# Patient Record
Sex: FEMALE | Race: WHITE | NOT HISPANIC OR LATINO | Employment: UNEMPLOYED | URBAN - NONMETROPOLITAN AREA
[De-identification: names, ages, dates, MRNs, and addresses within clinical notes are randomized per-mention and may not be internally consistent; named-entity substitution may affect disease eponyms.]

---

## 2021-01-20 DIAGNOSIS — I42.9 CARDIOMYOPATHY, UNSPECIFIED TYPE (HCC): Primary | ICD-10-CM

## 2021-01-28 ENCOUNTER — OFFICE VISIT (OUTPATIENT)
Dept: CARDIOLOGY | Facility: CLINIC | Age: 36
End: 2021-01-28

## 2021-01-28 ENCOUNTER — LAB (OUTPATIENT)
Dept: LAB | Facility: HOSPITAL | Age: 36
End: 2021-01-28

## 2021-01-28 VITALS
DIASTOLIC BLOOD PRESSURE: 72 MMHG | BODY MASS INDEX: 24.64 KG/M2 | HEIGHT: 67 IN | WEIGHT: 157 LBS | OXYGEN SATURATION: 98 % | HEART RATE: 71 BPM | SYSTOLIC BLOOD PRESSURE: 120 MMHG

## 2021-01-28 DIAGNOSIS — G47.33 OSA ON CPAP: ICD-10-CM

## 2021-01-28 DIAGNOSIS — I42.9 CARDIOMYOPATHY, UNSPECIFIED TYPE (HCC): Primary | ICD-10-CM

## 2021-01-28 DIAGNOSIS — I10 ESSENTIAL HYPERTENSION: ICD-10-CM

## 2021-01-28 DIAGNOSIS — Z99.89 OSA ON CPAP: ICD-10-CM

## 2021-01-28 DIAGNOSIS — R07.2 PRECORDIAL PAIN: ICD-10-CM

## 2021-01-28 DIAGNOSIS — I42.9 CARDIOMYOPATHY, UNSPECIFIED TYPE (HCC): ICD-10-CM

## 2021-01-28 LAB
ALBUMIN SERPL-MCNC: 4.5 G/DL (ref 3.5–5.2)
ALBUMIN/GLOB SERPL: 1.6 G/DL
ALP SERPL-CCNC: 64 U/L (ref 39–117)
ALT SERPL W P-5'-P-CCNC: 16 U/L (ref 1–33)
ANION GAP SERPL CALCULATED.3IONS-SCNC: 7.9 MMOL/L (ref 5–15)
AST SERPL-CCNC: 20 U/L (ref 1–32)
BASOPHILS # BLD AUTO: 0.03 10*3/MM3 (ref 0–0.2)
BASOPHILS NFR BLD AUTO: 0.3 % (ref 0–1.5)
BILIRUB SERPL-MCNC: 0.3 MG/DL (ref 0–1.2)
BUN SERPL-MCNC: 19 MG/DL (ref 6–20)
BUN/CREAT SERPL: 26.8 (ref 7–25)
CALCIUM SPEC-SCNC: 9.7 MG/DL (ref 8.6–10.5)
CHLORIDE SERPL-SCNC: 106 MMOL/L (ref 98–107)
CHOLEST SERPL-MCNC: 159 MG/DL (ref 0–200)
CO2 SERPL-SCNC: 24.1 MMOL/L (ref 22–29)
CREAT SERPL-MCNC: 0.71 MG/DL (ref 0.57–1)
DEPRECATED RDW RBC AUTO: 43.3 FL (ref 37–54)
EOSINOPHIL # BLD AUTO: 0 10*3/MM3 (ref 0–0.4)
EOSINOPHIL NFR BLD AUTO: 0 % (ref 0.3–6.2)
ERYTHROCYTE [DISTWIDTH] IN BLOOD BY AUTOMATED COUNT: 13.8 % (ref 12.3–15.4)
GFR SERPL CREATININE-BSD FRML MDRD: 93 ML/MIN/1.73
GLOBULIN UR ELPH-MCNC: 2.8 GM/DL
GLUCOSE SERPL-MCNC: 125 MG/DL (ref 65–99)
HCT VFR BLD AUTO: 36.6 % (ref 34–46.6)
HDLC SERPL-MCNC: 58 MG/DL (ref 40–60)
HGB BLD-MCNC: 11.9 G/DL (ref 12–15.9)
IMM GRANULOCYTES # BLD AUTO: 0.06 10*3/MM3 (ref 0–0.05)
IMM GRANULOCYTES NFR BLD AUTO: 0.6 % (ref 0–0.5)
LDLC SERPL CALC-MCNC: 91 MG/DL (ref 0–100)
LDLC/HDLC SERPL: 1.58 {RATIO}
LYMPHOCYTES # BLD AUTO: 1.34 10*3/MM3 (ref 0.7–3.1)
LYMPHOCYTES NFR BLD AUTO: 12.4 % (ref 19.6–45.3)
MCH RBC QN AUTO: 28.2 PG (ref 26.6–33)
MCHC RBC AUTO-ENTMCNC: 32.5 G/DL (ref 31.5–35.7)
MCV RBC AUTO: 86.7 FL (ref 79–97)
MONOCYTES # BLD AUTO: 0.15 10*3/MM3 (ref 0.1–0.9)
MONOCYTES NFR BLD AUTO: 1.4 % (ref 5–12)
NEUTROPHILS NFR BLD AUTO: 85.3 % (ref 42.7–76)
NEUTROPHILS NFR BLD AUTO: 9.26 10*3/MM3 (ref 1.7–7)
NRBC BLD AUTO-RTO: 0 /100 WBC (ref 0–0.2)
NT-PROBNP SERPL-MCNC: 92.5 PG/ML (ref 0–450)
PLATELET # BLD AUTO: 327 10*3/MM3 (ref 140–450)
PMV BLD AUTO: 9.7 FL (ref 6–12)
POTASSIUM SERPL-SCNC: 4.3 MMOL/L (ref 3.5–5.2)
PROT SERPL-MCNC: 7.3 G/DL (ref 6–8.5)
RBC # BLD AUTO: 4.22 10*6/MM3 (ref 3.77–5.28)
SODIUM SERPL-SCNC: 138 MMOL/L (ref 136–145)
TRIGL SERPL-MCNC: 48 MG/DL (ref 0–150)
TSH SERPL DL<=0.05 MIU/L-ACNC: 0.29 UIU/ML (ref 0.27–4.2)
VLDLC SERPL-MCNC: 10 MG/DL (ref 5–40)
WBC # BLD AUTO: 10.84 10*3/MM3 (ref 3.4–10.8)

## 2021-01-28 PROCEDURE — 80050 GENERAL HEALTH PANEL: CPT

## 2021-01-28 PROCEDURE — 36415 COLL VENOUS BLD VENIPUNCTURE: CPT

## 2021-01-28 PROCEDURE — 99204 OFFICE O/P NEW MOD 45 MIN: CPT | Performed by: NURSE PRACTITIONER

## 2021-01-28 PROCEDURE — 80061 LIPID PANEL: CPT

## 2021-01-28 PROCEDURE — 83880 ASSAY OF NATRIURETIC PEPTIDE: CPT

## 2021-01-28 RX ORDER — FUROSEMIDE 20 MG/1
TABLET ORAL
COMMUNITY
Start: 2021-01-14 | End: 2021-01-28 | Stop reason: SDUPTHER

## 2021-01-28 RX ORDER — PREDNISONE 10 MG/1
TABLET ORAL
COMMUNITY
Start: 2021-01-26 | End: 2021-03-10 | Stop reason: ALTCHOICE

## 2021-01-28 RX ORDER — CARVEDILOL 12.5 MG/1
12.5 TABLET ORAL 2 TIMES DAILY
Qty: 60 TABLET | Refills: 5 | Status: SHIPPED | OUTPATIENT
Start: 2021-01-28 | End: 2021-03-10 | Stop reason: SDUPTHER

## 2021-01-28 RX ORDER — LORATADINE 10 MG/1
10 TABLET ORAL DAILY
COMMUNITY
Start: 2021-01-14

## 2021-01-28 RX ORDER — FUROSEMIDE 20 MG/1
20 TABLET ORAL DAILY PRN
Qty: 30 TABLET | Refills: 5 | Status: SHIPPED | OUTPATIENT
Start: 2021-01-28

## 2021-01-28 RX ORDER — CARVEDILOL 12.5 MG/1
12.5 TABLET ORAL 2 TIMES DAILY
COMMUNITY
Start: 2021-01-14 | End: 2021-01-28 | Stop reason: SDUPTHER

## 2021-01-28 RX ORDER — LISINOPRIL 2.5 MG/1
2.5 TABLET ORAL DAILY
Qty: 30 TABLET | Refills: 5 | Status: SHIPPED | OUTPATIENT
Start: 2021-01-28 | End: 2021-03-10 | Stop reason: SDUPTHER

## 2021-01-28 NOTE — PROGRESS NOTES
[facility] CHF CLINIC OFFICE VISIT    Subjective:     Congestive Heart Failure (New Patient)      Cardiomyopathy  This is a chronic (Hx of previously low EF 31-37 in 2016. ) problem. The current episode started more than 1 year ago. The problem occurs rarely. The problem has been gradually improving. Associated symptoms include chest pain and fatigue. Pertinent negatives include no abdominal pain, anorexia, arthralgias, change in bowel habit, chills, congestion, coughing, diaphoresis, fever, headaches, joint swelling, myalgias, nausea, neck pain, numbness, rash, sore throat, swollen glands, urinary symptoms, vertigo, visual change, vomiting or weakness. Nothing aggravates the symptoms. She has tried nothing for the symptoms. The treatment provided mild relief.         PCP: Merna ROWELL  Cardiologist:  Nephrologist:  Pulmonologist:    Hospitalizations:      Elmer Bhardwaj is a 35-year-old female with PMH of cardiomyopathy, congestive heart failure with history of previously reduced LVEF, patient mentions that previously being 31 to 37%. Patient used to see Dr. Novoa at [facility] cardiology. Mentions being diagnosed with cardiomyopathy at age 25.  Last echo from 2016 showing LVEF 41%, left atrium is mildly dilated, trace tricuspid valve regurgitation, mildly reduced left ventricular systolic function.  Patient mentions having previous nuclear stress test and left heart catheterization. Mentions mild CAD at that time that did not require intervention.  We will attempt to retrieve those records for clarification.  Patient mentions previously being on medications for her heart failure and having volume overload. She was lost to follow up d/t compliance issues.  Other PMH: KANDY (has not followed with sleep medicine in some time), HTN, asthma, and allergic rhinitis.     Patient is now in a drug rehab program [place] [facility].  She reports having history of meth use, last used 4 to 5 months ago.  Former Smoker. Denies alcohol use.     She recently seen PCP and was restarted on her cardiomyopathy medications and hypertensive medications.  She is currently taking carvedilol 12.5 mg twice daily, Lasix 20 mg daily as needed.  Albuterol and Breo for her asthma.  Patient mentions having occasional intermittent edema and occasional shortness of breath.  Today she is NYHA class I and no limitations of physical activity.  Is currently working as a  and  for [employer].  She denies orthopnea, PND, dizziness or syncope.    She does complain of central substernal chest pain described as sharp and stabbing x several months.  This occurs daily.  Is intermittent.  Waxes and wanes.  Denies any worsening or relieving factors.  Associated symptoms of shortness of breath and fatigue.    EKG at PCPs office NSR with no acute ST-T wave changes.       Echocardiogram was completed today showing normal LVEF at 61%, left ventricular systolic function is normal, left ventricular diastolic function is normal, no regional wall motion abnormalities detected, mild tricuspid valve regurgitation.  Discussed with patient today that LVEF has improved back to baseline.    Past Medical History:   Diagnosis Date   • Allergies    • Asthma    • CHF (congestive heart failure) (CMS/[facility])    • Hypertension    • Sleep apnea      Past Surgical History:   Procedure Laterality Date   •  SECTION     • CHOLECYSTECTOMY       Social History     Socioeconomic History   • Marital status: Single     Spouse name: Not on file   • Number of children: Not on file   • Years of education: Not on file   • Highest education level: Not on file   Tobacco Use   • Smoking status: Former Smoker   • Smokeless tobacco: Never Used   Substance and Sexual Activity   • Alcohol use: Never     Frequency: Never   • Drug use: Not Currently     History reviewed. No pertinent family history.    Allergies:  Allergies   Allergen Reactions   • Latex Hives   •  Methotrexate Derivatives Swelling       Review of Systems   Constitution: Positive for fatigue. Negative for chills, diaphoresis, fever, malaise/fatigue and weight gain.   HENT: Negative for congestion, nosebleeds, sore throat and tinnitus.    Eyes: Negative for blurred vision and double vision.   Cardiovascular: Positive for chest pain. Negative for dyspnea on exertion, irregular heartbeat, leg swelling, palpitations and syncope.   Respiratory: Negative for cough, shortness of breath, sleep disturbances due to breathing and snoring.    Endocrine: Negative for polydipsia, polyphagia and polyuria.   Hematologic/Lymphatic: Negative for bleeding problem. Does not bruise/bleed easily.   Skin: Negative for color change, rash and suspicious lesions.   Musculoskeletal: Negative for arthralgias, falls, joint swelling, myalgias and neck pain.   Gastrointestinal: Negative for bloating, abdominal pain, anorexia, change in bowel habit, heartburn, hematochezia, nausea and vomiting.   Genitourinary: Negative for dysuria and hematuria.   Neurological: Negative for dizziness, headaches, numbness, seizures, vertigo and weakness.   Psychiatric/Behavioral: Negative for altered mental status and depression. The patient does not have insomnia and is not nervous/anxious.    Allergic/Immunologic: Negative for environmental allergies and persistent infections.       Current Outpatient Medications   Medication Sig Dispense Refill   • Advair Diskus 250-50 MCG/DOSE DISKUS Inhale 1 puff 2 (Two) Times a Day.     • carvedilol (COREG) 12.5 MG tablet Take 1 tablet by mouth 2 (Two) Times a Day. 60 tablet 5   • furosemide (LASIX) 20 MG tablet Take 1 tablet by mouth Daily As Needed (edema, weight gain of 2-3 lbs). 30 tablet 5   • HM Loratadine 10 MG tablet Take 10 mg by mouth Daily.     • predniSONE (DELTASONE) 10 MG tablet TAKE AS DIRECTED PER PACKAGE DIRECTIONS (ATTACHED SHEET)     • ProAir  (90 Base) MCG/ACT inhaler Inhale 2 puffs Every 4  "(Four) Hours As Needed.     • lisinopril (PRINIVIL,ZESTRIL) 2.5 MG tablet Take 1 tablet by mouth Daily. 30 tablet 5     No current facility-administered medications for this visit.         Objective:     Vitals:    01/28/21 1339   BP: 120/72   Pulse: 71   SpO2: 98%   Weight: 71.2 kg (157 lb)   Height: 170 cm (66.93\")       Wt Readings from Last 3 Encounters:   01/28/21 71.2 kg (157 lb)   01/28/21 71.2 kg (157 lb)            Vitals signs reviewed.   Constitutional:       General: Not in acute distress.     Appearance: Normal appearance. Well-developed. Not toxic-appearing or diaphoretic.   Eyes:      General: Lids are normal.      Conjunctiva/sclera: Conjunctivae normal.   HENT:      Head: Normocephalic and atraumatic.      Right Ear: External ear normal.      Left Ear: External ear normal.   Neck:      Musculoskeletal: Normal range of motion and neck supple.      Vascular: No JVD.   Pulmonary:      Effort: Pulmonary effort is normal. No respiratory distress.      Breath sounds: Normal breath sounds. No decreased breath sounds. No wheezing. No rales.   Chest:      Chest wall: Not tender to palpatation.   Cardiovascular:      PMI at left midclavicular line. Normal rate. Regular rhythm. Normal S1 with normal intensity. Normal S2 with normal intensity.      Murmurs: There is no murmur.      No gallop. No S3 and S4 gallop. No click. No rub.   Pulses:     Intact distal pulses. No decreased pulses.   Edema:     Peripheral edema absent.   Abdominal:      General: Bowel sounds are normal. There is no distension.      Palpations: Abdomen is soft.      Tenderness: There is no abdominal tenderness.   Skin:     General: Skin is warm and dry.      Coloration: Skin is not pale.      Findings: No erythema or rash.   Neurological:      Mental Status: Alert and oriented to person, place, and time.      Gait: Gait normal.   Psychiatric:         Behavior: Behavior normal.         Thought Content: Thought content normal.         " Judgment: Judgment normal.         Cardiographics  Results for orders placed during the hospital encounter of 01/28/21   Adult Transthoracic Echo Complete w/ Color, Spectral and Contrast if Necessary Per Protocol    Narrative · Estimated left ventricular EF = 61% Left ventricular ejection fraction   appears to be 61 - 65%. Left ventricular systolic function is normal.  · Left ventricular diastolic function was normal.  · Estimated right ventricular systolic pressure from tricuspid   regurgitation is normal (<35 mmHg).          @LPGRADLAST    No radiology results for the last 30 days.    Echocardiogram:     1/28/2021  Left Ventricle Estimated left ventricular EF = 61% Left ventricular ejection fraction appears to be 61 - 65%. Left ventricular systolic function is normal.   Normal left ventricular cavity size and wall thickness noted. All left ventricular wall segments contract normally. Left ventricular diastolic function was normal.   Right Ventricle Normal right ventricular cavity size and systolic function noted. There is no evidence of a right ventricular thrombus or mass present.   Left Atrium Normal left atrial size and volume noted. No evidence of left atrial thrombus or mass present.   Right Atrium Normal right atrial cavity size noted.   Aortic Valve The aortic valve is grossly normal in structure. No significant aortic valve regurgitation is present. No hemodynamically significant aortic valve stenosis is present.   Mitral Valve The mitral valve is grossly normal in structure. Trace to mild mitral valve regurgitation is present.   Tricuspid Valve Mild tricuspid valve regurgitation is present. Estimated right ventricular systolic pressure from tricuspid regurgitation is normal (<35 mmHg). No evidence of pulmonary hypertension is present.   Pulmonic Valve The pulmonic valve is grossly normal in structure. There is no significant pulmonic valve regurgitation present.   Greater Vessels No dilation of the aortic  root is present. No dilation of the sinuses of Valsalva is present. No dilation of the proximal aorta is present.   Pericardium There is no evidence of pericardial effusion. .           2016           EKG:    Stress Testing:     C:    Imaging    Chest x-ray:    CT:    Lab Review     Lab Results   Component Value Date    TSH 0.295 01/28/2021     Lab Results   Component Value Date    GLUCOSE 125 (H) 01/28/2021    BUN 19 01/28/2021    CREATININE 0.71 01/28/2021    EGFRIFNONA 93 01/28/2021    BCR 26.8 (H) 01/28/2021    K 4.3 01/28/2021    CO2 24.1 01/28/2021    CALCIUM 9.7 01/28/2021    ALBUMIN 4.50 01/28/2021    AST 20 01/28/2021    ALT 16 01/28/2021     Lab Results   Component Value Date    WBC 10.84 (H) 01/28/2021    HGB 11.9 (L) 01/28/2021    HCT 36.6 01/28/2021    MCV 86.7 01/28/2021     01/28/2021       No results found for: CKTOTAL, CKMB, CKMBINDEX, TROPONINI, TROPONINT  Lab Results   Component Value Date    PROBNP 92.5 01/28/2021       PFTS:         The following portions of the patient's history were reviewed and updated as appropriate: allergies, current medications, past family history, past medical history, past social history, past surgical history and problem list.     Old records reviewed and pertinent information is included in the above objective data.     Assessment/Plan:      Diagnosis Plan   1. Cardiomyopathy, unspecified type (CMS/HCC)  lisinopril (PRINIVIL,ZESTRIL) 2.5 MG tablet    carvedilol (COREG) 12.5 MG tablet    furosemide (LASIX) 20 MG tablet    Stress Test With Myocardial Perfusion One Day    Comprehensive Metabolic Panel    CBC & Differential    TSH    Lipid Panel    proBNP    Likely NICM d/t previous drug use.  History of previously reduced LVEF in 2016 --31 to 37%, new echo today showing normal LVEF at 61%.  NYHA Class I Stage C. Patient appears euvolemic.  and in a well perfused physiologic state. Hemodynamics are acceptable  BETA-BLOCKER: Coreg 12.5 mg twice daily  ACE/ARB:  Add lisinopril 2.5 mg daily  ENTRESTO: not indicated  DIURETIC: Lasix 20 mg daily as needed  ALDOSTERONE ANTAGONIST: Not currently indicated.  IMDUR/HYDRALAZINE: N/A  DIGOXIN: N/A  Fluid restriction: 2 L  Sodium restriction:2 grams  6MWT:   Cardiac Rehab: not indicated  ICD: not indicated     Presented an overview of heart failure, expected course, considerations, risk factors and exacerbation prevention.  Recommended daily weight monitoring. Information provided.  Recommended restricted dietary sodium intake of 2g/day  Fluid restrictions of 2L/day.  Discussed patient action plan for heart failure;  Recommended avoiding NSAIDs use.  Discussed warning signs requiring additional medical attention for heart failure.  Do not hesitate to contact this office for further symptoms or concerns. Contact information provided to the patient and understanding verbalized.        2.  Precordial Pain Chest pain syndrome. Pain characteristic: atypical angina   Patient is Moderate Risk.     Ischemic evaluation:ordered.   The patient is able to exercise. hx of cardiomyopathy, previous low LVEF  EKG is Normal  Risks/Benefits discussed  Ischemia evaluation with Exercise Cardiolite  TTE -improvement in LVEF to 61%, no RWMAs, no significant valvular abnormalities.  Basic Labs, PA/LA CXR (results reviewed if completed)       3.  Essential hypertension   chronic, controlled.  Continue Coreg 12.5 mg twice daily, Added lisinopril 2.5mg daily       4. KANDY on CPAP  Ambulatory Referral to Sleep Medicine    Patient reports he is history of KANDY, and used to wear CPAP machine.  Patient was requesting to reestablish with sleep medicine.         Follow up: 1 month              This document has been electronically signed by LELIA Caro on January 29, 2021 09:09 CST

## 2021-01-29 ENCOUNTER — TELEPHONE (OUTPATIENT)
Dept: CARDIOLOGY | Facility: CLINIC | Age: 36
End: 2021-01-29

## 2021-01-29 PROBLEM — G47.33 OSA ON CPAP: Status: ACTIVE | Noted: 2021-01-29

## 2021-01-29 PROBLEM — I10 ESSENTIAL HYPERTENSION: Status: ACTIVE | Noted: 2021-01-29

## 2021-01-29 PROBLEM — I42.9 CARDIOMYOPATHY (HCC): Status: ACTIVE | Noted: 2021-01-29

## 2021-01-29 PROBLEM — R07.2 PRECORDIAL PAIN: Status: ACTIVE | Noted: 2021-01-29

## 2021-01-29 PROBLEM — Z99.89 OSA ON CPAP: Status: ACTIVE | Noted: 2021-01-29

## 2021-01-29 NOTE — TELEPHONE ENCOUNTER
Spoke with mentor at [facility], she is listed as patient's contact information.  Thyroid function, cholesterol and heart failure labs all look good normal.  Patient's WBC and neutrophils are slightly elevated dust of a bacterial infection.  They requested labs to be faxed to them.  Directed her that if she is having symptoms of infection that she should call PCP to discuss if treatment is needed.                  This document has been electronically signed by LELAI Caro on January 29, 2021 11:14 CST

## 2021-02-19 ENCOUNTER — HOSPITAL ENCOUNTER (OUTPATIENT)
Dept: CARDIOLOGY | Facility: HOSPITAL | Age: 36
Discharge: HOME | End: 2021-02-19

## 2021-02-19 ENCOUNTER — HOSPITAL ENCOUNTER (OUTPATIENT)
Dept: NUCLEAR MEDICINE | Facility: HOSPITAL | Age: 36
Discharge: HOME | End: 2021-02-19

## 2021-02-19 DIAGNOSIS — I42.9 CARDIOMYOPATHY, UNSPECIFIED TYPE (HCC): ICD-10-CM

## 2021-02-19 LAB
BH CV STRESS BP STAGE 1: NORMAL
BH CV STRESS BP STAGE 2: NORMAL
BH CV STRESS BP STAGE 3: NORMAL
BH CV STRESS DURATION MIN STAGE 1: 3
BH CV STRESS DURATION MIN STAGE 2: 3
BH CV STRESS DURATION MIN STAGE 3: 3
BH CV STRESS DURATION MIN STAGE 4: 3
BH CV STRESS DURATION SEC STAGE 1: 0
BH CV STRESS DURATION SEC STAGE 2: 0
BH CV STRESS DURATION SEC STAGE 3: 0
BH CV STRESS DURATION SEC STAGE 4: 21
BH CV STRESS GRADE STAGE 1: 10
BH CV STRESS GRADE STAGE 2: 12
BH CV STRESS GRADE STAGE 3: 14
BH CV STRESS GRADE STAGE 4: 16
BH CV STRESS HR STAGE 1: 88
BH CV STRESS HR STAGE 2: 108
BH CV STRESS HR STAGE 3: 141
BH CV STRESS HR STAGE 4: 155
BH CV STRESS METS STAGE 1: 5
BH CV STRESS METS STAGE 2: 7.5
BH CV STRESS METS STAGE 3: 10
BH CV STRESS METS STAGE 4: 13.5
BH CV STRESS PROTOCOL 1: NORMAL
BH CV STRESS RECOVERY BP: NORMAL MMHG
BH CV STRESS RECOVERY HR: 98 BPM
BH CV STRESS SPEED STAGE 1: 1.7
BH CV STRESS SPEED STAGE 2: 2.5
BH CV STRESS SPEED STAGE 3: 3.4
BH CV STRESS SPEED STAGE 4: 4.2
BH CV STRESS STAGE 1: 1
BH CV STRESS STAGE 2: 2
BH CV STRESS STAGE 3: 3
BH CV STRESS STAGE 4: 4
LV EF NUC BP: 60 %
MAXIMAL PREDICTED HEART RATE: 183 BPM
PERCENT MAX PREDICTED HR: 85.79 %
STRESS BASELINE BP: NORMAL MMHG
STRESS BASELINE HR: 62 BPM
STRESS PERCENT HR: 101 %
STRESS POST ESTIMATED WORKLOAD: 13.4 METS
STRESS POST EXERCISE DUR MIN: 12 MIN
STRESS POST EXERCISE DUR SEC: 21 SEC
STRESS POST PEAK BP: NORMAL MMHG
STRESS POST PEAK HR: 157 BPM
STRESS TARGET HR: 156 BPM

## 2021-02-19 PROCEDURE — 93018 CV STRESS TEST I&R ONLY: CPT | Performed by: INTERNAL MEDICINE

## 2021-02-19 PROCEDURE — 93017 CV STRESS TEST TRACING ONLY: CPT

## 2021-02-19 PROCEDURE — A9500 TC99M SESTAMIBI: HCPCS | Performed by: NURSE PRACTITIONER

## 2021-02-19 PROCEDURE — 78452 HT MUSCLE IMAGE SPECT MULT: CPT | Performed by: INTERNAL MEDICINE

## 2021-02-19 PROCEDURE — 0 TECHNETIUM SESTAMIBI: Performed by: NURSE PRACTITIONER

## 2021-02-19 PROCEDURE — 78452 HT MUSCLE IMAGE SPECT MULT: CPT

## 2021-02-19 PROCEDURE — 93016 CV STRESS TEST SUPVJ ONLY: CPT | Performed by: INTERNAL MEDICINE

## 2021-02-19 RX ADMIN — TECHNETIUM TC 99M SESTAMIBI 1 DOSE: 1 INJECTION INTRAVENOUS at 09:30

## 2021-02-19 RX ADMIN — TECHNETIUM TC 99M SESTAMIBI 1 DOSE: 1 INJECTION INTRAVENOUS at 10:30

## 2021-03-10 ENCOUNTER — OFFICE VISIT (OUTPATIENT)
Dept: CARDIOLOGY | Facility: CLINIC | Age: 36
End: 2021-03-10

## 2021-03-10 VITALS
SYSTOLIC BLOOD PRESSURE: 117 MMHG | HEART RATE: 65 BPM | DIASTOLIC BLOOD PRESSURE: 71 MMHG | HEIGHT: 67 IN | WEIGHT: 177.8 LBS | OXYGEN SATURATION: 98 % | BODY MASS INDEX: 27.91 KG/M2

## 2021-03-10 DIAGNOSIS — J45.40 MODERATE PERSISTENT ASTHMA WITHOUT COMPLICATION: ICD-10-CM

## 2021-03-10 DIAGNOSIS — G47.33 OSA ON CPAP: ICD-10-CM

## 2021-03-10 DIAGNOSIS — R07.2 PRECORDIAL PAIN: ICD-10-CM

## 2021-03-10 DIAGNOSIS — Z99.89 OSA ON CPAP: ICD-10-CM

## 2021-03-10 DIAGNOSIS — I10 ESSENTIAL HYPERTENSION: ICD-10-CM

## 2021-03-10 DIAGNOSIS — I42.8 NICM (NONISCHEMIC CARDIOMYOPATHY) (HCC): Primary | ICD-10-CM

## 2021-03-10 LAB
QT INTERVAL: 414 MS
QTC INTERVAL: 434 MS

## 2021-03-10 PROCEDURE — 93000 ELECTROCARDIOGRAM COMPLETE: CPT | Performed by: INTERNAL MEDICINE

## 2021-03-10 PROCEDURE — 99214 OFFICE O/P EST MOD 30 MIN: CPT | Performed by: NURSE PRACTITIONER

## 2021-03-10 RX ORDER — LISINOPRIL 2.5 MG/1
2.5 TABLET ORAL DAILY
Qty: 30 TABLET | Refills: 2 | Status: SHIPPED | OUTPATIENT
Start: 2021-03-10

## 2021-03-10 RX ORDER — CEFPROZIL 250 MG/1
250 TABLET, FILM COATED ORAL 2 TIMES DAILY
COMMUNITY

## 2021-03-10 RX ORDER — CARVEDILOL 12.5 MG/1
12.5 TABLET ORAL 2 TIMES DAILY
Qty: 60 TABLET | Refills: 2 | Status: SHIPPED | OUTPATIENT
Start: 2021-03-10

## 2021-03-10 NOTE — PROGRESS NOTES
[facility] CHF CLINIC OFFICE VISIT    Subjective:     Cardiomyopathy and Results (chief complaint)      Cardiomyopathy  This is a chronic (Hx of previously low EF 31-37 in 2016. ) problem. The current episode started more than 1 year ago. The problem occurs rarely. The problem has been gradually improving. Associated symptoms include chest pain and fatigue. Pertinent negatives include no abdominal pain, anorexia, arthralgias, change in bowel habit, chills, congestion, coughing, diaphoresis, fever, headaches, joint swelling, myalgias, nausea, neck pain, numbness, rash, sore throat, swollen glands, urinary symptoms, vertigo, visual change, vomiting or weakness. Nothing aggravates the symptoms. She has tried nothing for the symptoms. The treatment provided mild relief.       PCP: Mrena ROWELL  Cardiologist:  Nephrologist:  Pulmonologist:    Hospitalizations:      Elmer Bhardwaj is a 35-year-old female with PMH of cardiomyopathy, congestive heart failure with history of previously reduced LVEF, patient mentions that previously being 31 to 37%. Patient used to see Dr. Novoa at [facility] cardiology. Mentions being diagnosed with cardiomyopathy at age 25.  Last echo from 2016 showing LVEF 41%, left atrium is mildly dilated, trace tricuspid valve regurgitation, mildly reduced left ventricular systolic function.  Patient mentions having previous nuclear stress test and left heart catheterization. Mentions mild CAD at that time that did not require intervention, however I was unable to find evidence of this in her medical records. Patient mentions previously being on medications for her heart failure and having volume overload. She was lost to follow up d/t compliance issues.  Other PMH: KANDY (has not followed with sleep medicine in some time), HTN, asthma, and allergic rhinitis.     Patient is now in a drug rehab program [facility] [place] in [facility].  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 Former Smoker. Denies alcohol use.     She seen PCP and was restarted on her cardiomyopathy medications and hypertensive medications.  She is currently taking carvedilol 12.5 mg twice daily, Lasix 20 mg daily as needed, and lisinopril 2.5 mg. Albuterol and Breo for her asthma.  Patient mentions having occasional intermittent edema and occasional shortness of breath.  Today she is NYHA class I and no limitations of physical activity.  Is currently working as a  and  for [employer].  She denies orthopnea, PND, dizziness or syncope.    At her last office visit, she was complaining of intermittent substernal chest pain times several months.  In evaluation of her symptoms patient underwent exercise Cardiolite.  Patient exercised for 12 minutes and 21 seconds achieving 13.4 METs of workload.  Myocardial perfusion imaging indicates a normal myocardial perfusion study with no evidence of ischemia.  ECG stress test.  Impressions consistent with a low risk study.  Echocardiogram was updated showing normal LVEF at 61%, left ventricular systolic function is normal, left ventricular diastolic function is normal, no regional wall motion abnormalities detected, mild tricuspid valve regurgitation.  Discussed with patient today that LVEF has improved back to baseline.           Past Medical History:   Diagnosis Date   • Allergies    • Asthma    • CHF (congestive heart failure) (CMS/[facility])    • Hypertension    • Sleep apnea      Past Surgical History:   Procedure Laterality Date   •  SECTION     • CHOLECYSTECTOMY       Social History     Socioeconomic History   • Marital status: Single     Spouse name: Not on file   • Number of children: Not on file   • Years of education: Not on file   • Highest education level: Not on file   Tobacco Use   • Smoking status: Never Smoker   • Smokeless tobacco: Never Used   Substance and Sexual Activity   • Alcohol use: Never   • Drug use: Not Currently     History  reviewed. No pertinent family history.    Allergies:  Allergies   Allergen Reactions   • Latex Hives   • Methotrexate Derivatives Swelling       Review of Systems   Constitutional: Positive for fatigue. Negative for chills, diaphoresis, fever, malaise/fatigue and weight gain.   HENT: Negative for congestion, nosebleeds, sore throat and tinnitus.    Eyes: Negative for blurred vision and double vision.   Cardiovascular: Positive for chest pain. Negative for dyspnea on exertion, irregular heartbeat, leg swelling, palpitations and syncope.   Respiratory: Negative for cough, shortness of breath, sleep disturbances due to breathing and snoring.    Endocrine: Negative for polydipsia, polyphagia and polyuria.   Hematologic/Lymphatic: Negative for bleeding problem. Does not bruise/bleed easily.   Skin: Negative for color change, rash and suspicious lesions.   Musculoskeletal: Negative for arthralgias, falls, joint swelling, myalgias and neck pain.   Gastrointestinal: Negative for bloating, abdominal pain, anorexia, change in bowel habit, heartburn, hematochezia, nausea and vomiting.   Genitourinary: Negative for dysuria and hematuria.   Neurological: Negative for dizziness, headaches, numbness, seizures, vertigo and weakness.   Psychiatric/Behavioral: Negative for altered mental status and depression. The patient does not have insomnia and is not nervous/anxious.    Allergic/Immunologic: Negative for environmental allergies and persistent infections.       Current Outpatient Medications   Medication Sig Dispense Refill   • Advair Diskus 250-50 MCG/DOSE DISKUS Inhale 1 puff 2 (Two) Times a Day. 60 each 5   • carvedilol (COREG) 12.5 MG tablet Take 1 tablet by mouth 2 (Two) Times a Day. 60 tablet 2   • cefprozil (CEFZIL) 250 MG tablet Take 250 mg by mouth 2 (Two) Times a Day.     • furosemide (LASIX) 20 MG tablet Take 1 tablet by mouth Daily As Needed (edema, weight gain of 2-3 lbs). 30 tablet 5   • HM Loratadine 10 MG tablet  "Take 10 mg by mouth Daily.     • lisinopril (PRINIVIL,ZESTRIL) 2.5 MG tablet Take 1 tablet by mouth Daily. 30 tablet 2   • ProAir  (90 Base) MCG/ACT inhaler Inhale 2 puffs Every 4 (Four) Hours As Needed for Wheezing or Shortness of Air. 18 g 5     No current facility-administered medications for this visit.        Objective:     Vitals:    03/10/21 0858   BP: 117/71   BP Location: Left arm   Patient Position: Sitting   Cuff Size: Adult   Pulse: 65   SpO2: 98%   Weight: 80.6 kg (177 lb 12.8 oz)   Height: 170 cm (66.93\")       Wt Readings from Last 3 Encounters:   03/10/21 80.6 kg (177 lb 12.8 oz)   01/28/21 71.2 kg (157 lb)   01/28/21 71.2 kg (157 lb)            Vitals reviewed.   Constitutional:       General: Not in acute distress.     Appearance: Normal appearance. Well-developed. Not toxic-appearing or diaphoretic.   Eyes:      General: Lids are normal.      Conjunctiva/sclera: Conjunctivae normal.   HENT:      Head: Normocephalic and atraumatic.      Right Ear: External ear normal.      Left Ear: External ear normal.   Neck:      Vascular: No JVD.   Pulmonary:      Effort: Pulmonary effort is normal. No respiratory distress.      Breath sounds: Normal breath sounds. No decreased breath sounds. No wheezing. No rales.   Chest:      Chest wall: Not tender to palpatation.   Cardiovascular:      PMI at left midclavicular line. Normal rate. Regular rhythm. Normal S1 with normal intensity. Normal S2 with normal intensity.      Murmurs: There is no murmur.      No gallop. No S3 and S4 gallop. No click. No rub.   Pulses:     Intact distal pulses. No decreased pulses.   Edema:     Peripheral edema absent.   Abdominal:      General: Bowel sounds are normal. There is no distension.      Palpations: Abdomen is soft.      Tenderness: There is no abdominal tenderness.   Musculoskeletal:      Cervical back: Normal range of motion and neck supple. Skin:     General: Skin is warm and dry.      Coloration: Skin is not pale. "      Findings: No erythema or rash.   Neurological:      Mental Status: Alert and oriented to person, place, and time.      Gait: Gait normal.   Psychiatric:         Behavior: Behavior normal.         Thought Content: Thought content normal.         Judgment: Judgment normal.         Cardiographics  Results for orders placed during the hospital encounter of 01/28/21    Adult Transthoracic Echo Complete w/ Color, Spectral and Contrast if Necessary Per Protocol    Interpretation Summary  · Estimated left ventricular EF = 61% Left ventricular ejection fraction appears to be 61 - 65%. Left ventricular systolic function is normal.  · Left ventricular diastolic function was normal.  · Estimated right ventricular systolic pressure from tricuspid regurgitation is normal (<35 mmHg).      @LPGRADLAST    No radiology results for the last 30 days.    Echocardiogram:     1/28/2021  Left Ventricle Estimated left ventricular EF = 61% Left ventricular ejection fraction appears to be 61 - 65%. Left ventricular systolic function is normal.   Normal left ventricular cavity size and wall thickness noted. All left ventricular wall segments contract normally. Left ventricular diastolic function was normal.   Right Ventricle Normal right ventricular cavity size and systolic function noted. There is no evidence of a right ventricular thrombus or mass present.   Left Atrium Normal left atrial size and volume noted. No evidence of left atrial thrombus or mass present.   Right Atrium Normal right atrial cavity size noted.   Aortic Valve The aortic valve is grossly normal in structure. No significant aortic valve regurgitation is present. No hemodynamically significant aortic valve stenosis is present.   Mitral Valve The mitral valve is grossly normal in structure. Trace to mild mitral valve regurgitation is present.   Tricuspid Valve Mild tricuspid valve regurgitation is present. Estimated right ventricular systolic pressure from tricuspid  regurgitation is normal (<35 mmHg). No evidence of pulmonary hypertension is present.   Pulmonic Valve The pulmonic valve is grossly normal in structure. There is no significant pulmonic valve regurgitation present.   Greater Vessels No dilation of the aortic root is present. No dilation of the sinuses of Valsalva is present. No dilation of the proximal aorta is present.   Pericardium There is no evidence of pericardial effusion. .           2016           EKG:        Stress Testing: Exercise Cardiolite 2/19/21    Interpretation Summary    · Findings consistent with a normal ECG stress test.  · Left ventricular ejection fraction is normal. (Calculated EF = 60%).  · Myocardial perfusion imaging indicates a normal myocardial perfusion study with no evidence of ischemia.  · Impressions are consistent with a low risk study.      Patient Hx Of Height, Weight, and Vitals    Height Weight BSA (Calculated - sq m) BMI (kg/m2) Pulse BP           Reason for Exam    Chest Pain; Ischemic Equivalent   Dx: Cardiomyopathy, unspecified type (CMS/HCC) [I42.9 (ICD-10-CM)]    Stress Data    Stage Heart Rate (BPM) Blood Pressure (mmHg) Minutes Seconds Grade (%) Speed (MPH)   1        88        112/60        3        0        10        1.7          2        108        130/58        3        0        12        2.5          3        141        132/58        3        0        14        3.4          4        155            3        21        16        4.2             Stress Measurements    Baseline Vitals   Baseline HR 62 bpm      Baseline /78 mmHg       Peak Stress Vitals   Peak  bpm      Peak /82 mmHg       Recovery Vitals   Recovery HR 98 bpm      Recovery /83 mmHg       Exercise Data   Target HR (85%) 156 bpm      Max. Pred. HR (100%) 183 bpm      Percent Max Pred HR 85.79 %      Exercise duration (min) 12 min      Exercise duration (sec) 21 sec      Estimated workload 13.4 METS         Study  Description    Nuclear Study Description A 1-day rest/stress protocol myocardial perfusion imaging study was performed. While at rest, the patient was injected intravenously with 10.7 mCi of technetium sestamibi at 09:30 CST. Rest imaging was performed approximately 60 minutes after the injection. While at peak stress, the patient was injected intravenously with 35 mCi of technetium sestamibi at 10:30 CST. Stress imaging was performed approximately 20 minutes after the injection. The total amount of radiation received in the study is about 13.79 mSv.   Nuclear Perfusion Images Overall image quality is good.   Imaging Contrast/Medications:     technetium sestamibi (CARDIOLITE) injection 1 dose   Given: 1 dose Intravenous    technetium sestamibi (CARDIOLITE) injection 1 dose   Given: 1 dose Intravenous   Stress Procedure    Rest ECG Baseline ECG of normal sinus rhythm noted. Normal baseline ECG noted at rest.   There was no ST segment deviation noted.   Stress Description A stress test was performed following the Julio protocol.   The patient reached the end of the protocol and achieved the target heart rate. The patient requested the test to be stopped  because the patient experienced fatigue and shortness of breath.   The patient reported dyspnea and shortness of breath during the stress test. Onset of symptoms occurred at stage 3 of the protocol. The patient experienced no angina during the stress test.   Blood pressure demonstrated a normal response to stress. Heart rate demonstrated a normal response to stress. Overall, the patient's exercise capacity was normal.   Stress ECG Stress ECG of normal sinus rhythm noted. Normal ECG with no significant stress induced changes noted.   There was no ST segment deviation noted during stress.   There were no arrhythmias during stress.   There were no significant arrhythmias noted during stress.      Stress ECG was interpretable and is consistent with a normal stress ECG.    Recovery ECG During recovery, the patient complained of no significant symptoms following stress.   Sinus rhythm was noted during recovery. Normal ECG with no significant recovery phase changes noted. There was no ST segment deviation noted during recovery.   There were no arrhythmias during recovery.   There were no significant arrhythmias during recovery.   Stress Findings No ECG evidence of myocardial ischemia.Negative clinical evidence of myocardial ischemia. Findings consistent with a normal ECG stress test.   Nuclear Perfusion Findings    Study Impression Myocardial perfusion imaging indicates a normal myocardial perfusion study with no evidence of ischemia. Impressions are consistent with a low risk study.   Rest Perfusion Defect 1 No rest myocardial perfusion defect noted.   Stress Perfusion Defect 1 No stress myocardial perfusion defect noted.   Ventricle Size / Description Left ventricular ejection fraction is normal.  (Calculated EF = 60%).   Order-Level Documents:    Scan on 2/19/2021 by Haven Cornejo APRN: STRESS TEST MYOCARDIAL PERFUSION,[facility],02/19/2021         PACS Images     Show images for Stress Test With Myocardial Perfusion One Day    [employer] Images     Show images for Stress Test With Myocardial Perfusion One Day      Signed    Electronically signed by Poornima Villanueva MD on 2/19/21 at 1207 CST       Lab Review     Lab Results   Component Value Date    TSH 0.295 01/28/2021     Lab Results   Component Value Date    GLUCOSE 125 (H) 01/28/2021    BUN 19 01/28/2021    CREATININE 0.71 01/28/2021    EGFRIFNONA 93 01/28/2021    BCR 26.8 (H) 01/28/2021    K 4.3 01/28/2021    CO2 24.1 01/28/2021    CALCIUM 9.7 01/28/2021    ALBUMIN 4.50 01/28/2021    AST 20 01/28/2021    ALT 16 01/28/2021     Lab Results   Component Value Date    WBC 10.84 (H) 01/28/2021    HGB 11.9 (L) 01/28/2021    HCT 36.6 01/28/2021    MCV 86.7 01/28/2021     01/28/2021       No results found for: CKTOTAL, CKMB,  CKMBINDEX, TROPONINI, TROPONINT  Lab Results   Component Value Date    PROBNP 92.5 01/28/2021            The following portions of the patient's history were reviewed and updated as appropriate: allergies, current medications, past family history, past medical history, past social history, past surgical history and problem list.     Old records reviewed and pertinent information is included in the above objective data.     Assessment/Plan:      Diagnosis Plan   1.  Hx of NICM     NICM d/t previous drug use.  History of previously reduced LVEF in 2016 --31 to 37%, new echo today showing normal LVEF at 61%.  NYHA Class I Stage C. Patient appears euvolemic.  and in a well perfused physiologic state. Hemodynamics are acceptable  BETA-BLOCKER: Coreg 12.5 mg twice daily  ACE/ARB: Lisinopril 2.5 mg daily  ENTRESTO: not indicated  DIURETIC: Lasix 20 mg daily as needed  ALDOSTERONE ANTAGONIST: Not currently indicated.  IMDUR/HYDRALAZINE: N/A  DIGOXIN: N/A  Fluid restriction: 2 L  Sodium restriction:2 grams  6MWT:   Cardiac Rehab: not indicated  ICD: not indicated     Reiterated all educational points this visit.  Continue daily weight monitoring.  Continue restricted dietary sodium intake.  Discussed patient action plan for heart failure. Contact information for this office verbalized.  Recommended avoiding NSAIDs use.  Discussed warning signs requiring additional medical attention for heart failure.   Education points repeated back by patient.        2.  Precordial Pain Chest pain syndrome. Pain characteristic: given normal cardiac work up, if symptoms continue, PCP should consider non-cardiac causes.     Ischemic evaluation:ordered.   Ischemia evaluation with Exercise Cardiolite;  Patient exercised for 12 minutes and 21 seconds achieving 13.4 METs of workload.  Myocardial perfusion imaging indicates a normal myocardial perfusion study with no evidence of ischemia.  ECG stress test.  Impressions consistent with a low risk  study.  TTE -improvement in LVEF to 61%, no RWMAs, no significant valvular abnormalities.  Basic Labs, PA/LA CXR (results reviewed if completed)     3.  Essential hypertension  Chronic, controlled.  Continue Coreg 12.5 mg twice daily. Lisinopril 2.5mg daily       4. KANDY on CPAP  Ambulatory Referral to Sleep Medicine    Patient reports he is history of KANDY, and used to wear CPAP machine.  Patient was requesting to reestablish with sleep medicine.     5. Asthma Advair for maintenance. ProAir PRN for soa/wheezing.          Follow up: 6 months                This document has been electronically signed by LELIA Caro on March 10, 2021 09:23 CST

## 2021-06-09 ENCOUNTER — TELEPHONE (OUTPATIENT)
Dept: CARDIOLOGY | Facility: CLINIC | Age: 36
End: 2021-06-09

## 2021-06-09 NOTE — TELEPHONE ENCOUNTER
Attempted to contact patient, patient would need to contact her pharmacy or insurance and let them know what happened, because her insurance most likely wont cover it

## 2021-06-15 ENCOUNTER — TELEPHONE (OUTPATIENT)
Dept: CARDIOLOGY | Facility: CLINIC | Age: 36
End: 2021-06-15

## 2021-06-15 NOTE — TELEPHONE ENCOUNTER
----- Message from Tawanna Muller sent at 6/14/2021 12:07 PM CDT -----  Pt called and left a vm needs to talk to you about the inahler that she called about last week to get filled.      Her number to call is [phone]    Thanks    Tawanna

## 2021-06-15 NOTE — TELEPHONE ENCOUNTER
Attempted to contact patient to let her know that her insurance probably wont cover the inhaler until its time for a refill, she can call her pharmacy or insurance to make sure because she already has refills with the prescription, had to leave a generic message

## 2021-06-17 ENCOUNTER — TELEPHONE (OUTPATIENT)
Dept: CARDIOLOGY | Facility: CLINIC | Age: 36
End: 2021-06-17

## 2021-06-17 NOTE — TELEPHONE ENCOUNTER
----- Message from Xuan Lozoya sent at 6/17/2021  7:53 AM CDT -----  Pt called and left 2 voicemails in regards to the medications that she had been talking to you about, she is asking that you return her call at [phone].  Messages were left after office hours on 6/16/2021 @738p and 381p

## 2021-06-17 NOTE — TELEPHONE ENCOUNTER
Attempted to contact patient again and left her a vm informing her that she refills on the inhaler that her dog ate, and that she would need to contact the pharmacy or her insurance to see if they will cover her getting a new one so soon.

## 2021-06-30 ENCOUNTER — TELEPHONE (OUTPATIENT)
Dept: CARDIOLOGY | Facility: CLINIC | Age: 36
End: 2021-06-30

## 2021-06-30 NOTE — TELEPHONE ENCOUNTER
----- Message from LELIA Caro sent at 6/29/2021  4:24 PM CDT -----  Can you please let patient know that I do not suspect a cardiac cause for her symptoms as her echocardiogram and stress test earlier this year both were normal. I do however feel she should see PCP this week. She does have asthma and this may be flared up or it may be more muscles/joint pain that they can treat. She may need CXR at PCPs office.     ----- Message -----  From: Hanny Jones RegSched Rep  Sent: 6/29/2021   4:14 PM CDT  To: LELIA Caro    Pt called wanted to get in sooner, she is having more chest pains/SOB/numbness in hands and feet.  Pt is not longer in rehab.         Can reach pt at [phone]